# Patient Record
Sex: MALE | Race: WHITE | Employment: FULL TIME | URBAN - METROPOLITAN AREA
[De-identification: names, ages, dates, MRNs, and addresses within clinical notes are randomized per-mention and may not be internally consistent; named-entity substitution may affect disease eponyms.]

---

## 2019-06-11 RX ORDER — TRAMADOL HYDROCHLORIDE 50 MG/1
50 TABLET ORAL EVERY 6 HOURS PRN
COMMUNITY

## 2019-06-11 NOTE — PROGRESS NOTES
C-Difficile admission screening and protocol:     * Admitted with diarrhea?no     *Prior history of C-Diff. In last 3 months?no     *Antibiotic use in the past 6-8 weeks?no     *Prior hospitalization or nursing home in the last [facility] 51 time___1030 per pt_________        Surgery time_1230___________    Take the following medications with a sip of water: Follow your MD/Surgeons pre-procedure instructions regarding your medications    Do not eat or drink anything after 12:00 midnight prior to your surgery. This includes water chewing gum, mints and ice chips. You may brush your teeth and gargle the morning of your surgery, but do not swallow the water     Please see your family doctor/pediatrician for a history and physical and/or concerning medications. Bring any test results/reports from your physicians office. If you are under the care of a heart doctor or specialist doctor, please be aware that you may be asked to them for clearance    You may be asked to stop blood thinners such as Coumadin, Plavix, Fragmin, Lovenox, etc., or any anti-inflammatories such as:  Aspirin, Ibuprofen, Advil, Naproxen prior to your surgery. We also ask that you stop any OTC medications such as fish oil, vitamin E, glucosamine, garlic, Multivitamins, COQ 10, etc.    We ask that you do not smoke 24 hours prior to surgery  We ask that you do not  drink any alcoholic beverages 24 hours prior to surgery     You must make arrangements for a responsible adult to take you home after your surgery. For your safety you will not be allowed to leave alone or drive yourself home. Your surgery will be cancelled if you do not have a ride home. Also for your safety, it is strongly suggested that someone stay with you the first 24 hours after your surgery.      A parent or legal guardian must accompany a child scheduled for surgery and plan to stay at the hospital until [facility] phone number:  [phone] PAT fax number:  [phone]  Please note these are generalized instructions for all surgical cases, you may be provided with more specific instructions according to your surgery.

## 2019-06-12 ENCOUNTER — ANESTHESIA EVENT (OUTPATIENT)
Dept: OPERATING ROOM | Age: 44
End: 2019-06-12
Payer: COMMERCIAL

## 2019-06-13 ENCOUNTER — APPOINTMENT (OUTPATIENT)
Dept: GENERAL RADIOLOGY | Age: 44
End: 2019-06-13
Attending: PODIATRIST
Payer: COMMERCIAL

## 2019-06-13 ENCOUNTER — ANESTHESIA (OUTPATIENT)
Dept: OPERATING ROOM | Age: 44
End: 2019-06-13
Payer: COMMERCIAL

## 2019-06-13 ENCOUNTER — HOSPITAL ENCOUNTER (OUTPATIENT)
Age: 44
Setting detail: OUTPATIENT SURGERY
Discharge: HOME | End: 2019-06-13
Attending: PODIATRIST | Admitting: PODIATRIST
Payer: COMMERCIAL

## 2019-06-13 VITALS
RESPIRATION RATE: 16 BRPM | TEMPERATURE: 98.1 F | OXYGEN SATURATION: 97 % | DIASTOLIC BLOOD PRESSURE: 64 MMHG | BODY MASS INDEX: 31.7 KG/M2 | SYSTOLIC BLOOD PRESSURE: 115 MMHG | HEART RATE: 94 BPM | HEIGHT: 72 IN | WEIGHT: 234.02 LBS

## 2019-06-13 VITALS
OXYGEN SATURATION: 94 % | DIASTOLIC BLOOD PRESSURE: 54 MMHG | TEMPERATURE: 99.1 F | RESPIRATION RATE: 4 BRPM | SYSTOLIC BLOOD PRESSURE: 95 MMHG

## 2019-06-13 DIAGNOSIS — G89.18 POST-OP PAIN: Primary | ICD-10-CM

## 2019-06-13 PROCEDURE — 3600000004 HC SURGERY LEVEL 4 BASE: Performed by: PODIATRIST

## 2019-06-13 PROCEDURE — C1769 GUIDE WIRE: HCPCS | Performed by: PODIATRIST

## 2019-06-13 PROCEDURE — 3700000000 HC ANESTHESIA ATTENDED CARE: Performed by: PODIATRIST

## 2019-06-13 PROCEDURE — 7100000010 HC PHASE II RECOVERY - FIRST 15 MIN: Performed by: PODIATRIST

## 2019-06-13 PROCEDURE — 2580000003 HC RX 258: Performed by: PODIATRIST

## 2019-06-13 PROCEDURE — 7100000011 HC PHASE II RECOVERY - ADDTL 15 MIN: Performed by: PODIATRIST

## 2019-06-13 PROCEDURE — 6360000002 HC RX W HCPCS: Performed by: ANESTHESIOLOGY

## 2019-06-13 PROCEDURE — 2580000003 HC RX 258: Performed by: ANESTHESIOLOGY

## 2019-06-13 PROCEDURE — 6370000000 HC RX 637 (ALT 250 FOR IP): Performed by: ANESTHESIOLOGY

## 2019-06-13 PROCEDURE — C1713 ANCHOR/SCREW BN/BN,TIS/BN: HCPCS | Performed by: PODIATRIST

## 2019-06-13 PROCEDURE — 6360000002 HC RX W HCPCS: Performed by: NURSE ANESTHETIST, CERTIFIED REGISTERED

## 2019-06-13 PROCEDURE — 7100000001 HC PACU RECOVERY - ADDTL 15 MIN: Performed by: PODIATRIST

## 2019-06-13 PROCEDURE — 2500000003 HC RX 250 WO HCPCS

## 2019-06-13 PROCEDURE — 6360000002 HC RX W HCPCS

## 2019-06-13 PROCEDURE — 3600000014 HC SURGERY LEVEL 4 ADDTL 15MIN: Performed by: PODIATRIST

## 2019-06-13 PROCEDURE — 2709999900 HC NON-CHARGEABLE SUPPLY: Performed by: PODIATRIST

## 2019-06-13 PROCEDURE — 2720000010 HC SURG SUPPLY STERILE: Performed by: PODIATRIST

## 2019-06-13 PROCEDURE — 3700000001 HC ADD 15 MINUTES (ANESTHESIA): Performed by: PODIATRIST

## 2019-06-13 PROCEDURE — 7100000000 HC PACU RECOVERY - FIRST 15 MIN: Performed by: PODIATRIST

## 2019-06-13 PROCEDURE — 73630 X-RAY EXAM OF FOOT: CPT

## 2019-06-13 PROCEDURE — 2500000003 HC RX 250 WO HCPCS: Performed by: PODIATRIST

## 2019-06-13 DEVICE — IMPLANTABLE DEVICE: Type: IMPLANTABLE DEVICE | Site: FOOT | Status: FUNCTIONAL

## 2019-06-13 DEVICE — SCREW BNE L18MM DIA3.5MM TI LO PROF FOR ANK FRAC SET: Type: IMPLANTABLE DEVICE | Site: FOOT | Status: FUNCTIONAL

## 2019-06-13 DEVICE — PLATE BNE SHT R PLNTR FOR COMPHSVE FT SYS LAPIDUS: Type: IMPLANTABLE DEVICE | Site: FOOT | Status: FUNCTIONAL

## 2019-06-13 DEVICE — IMPLANTABLE DEVICE
Type: IMPLANTABLE DEVICE | Site: FOOT | Status: FUNCTIONAL
Brand: MICROAIRE®

## 2019-06-13 DEVICE — SCREW BNE L13MM DIA2MM CORT FT ANK TI SELF DRL ST SLD FULL: Type: IMPLANTABLE DEVICE | Site: FOOT | Status: FUNCTIONAL

## 2019-06-13 RX ORDER — OXYCODONE AND ACETAMINOPHEN 10; 325 MG/1; MG/1
1 TABLET ORAL EVERY 6 HOURS PRN
Qty: 28 TABLET | Refills: 0 | Status: SHIPPED | OUTPATIENT
Start: 2019-06-13 | End: 2019-06-20

## 2019-06-13 RX ORDER — PROPOFOL 10 MG/ML
INJECTION, EMULSION INTRAVENOUS PRN
Status: DISCONTINUED | OUTPATIENT
Start: 2019-06-13 | End: 2019-06-13 | Stop reason: SDUPTHER

## 2019-06-13 RX ORDER — BUPIVACAINE HYDROCHLORIDE 5 MG/ML
INJECTION, SOLUTION EPIDURAL; INTRACAUDAL
Status: COMPLETED | OUTPATIENT
Start: 2019-06-13 | End: 2019-06-13

## 2019-06-13 RX ORDER — MORPHINE SULFATE 2 MG/ML
1 INJECTION, SOLUTION INTRAMUSCULAR; INTRAVENOUS EVERY 5 MIN PRN
Status: DISCONTINUED | OUTPATIENT
Start: 2019-06-13 | End: 2019-06-13 | Stop reason: HOSPADM

## 2019-06-13 RX ORDER — MEPERIDINE HYDROCHLORIDE 25 MG/ML
12.5 INJECTION INTRAMUSCULAR; INTRAVENOUS; SUBCUTANEOUS EVERY 5 MIN PRN
Status: DISCONTINUED | OUTPATIENT
Start: 2019-06-13 | End: 2019-06-13 | Stop reason: HOSPADM

## 2019-06-13 RX ORDER — OXYCODONE HYDROCHLORIDE AND ACETAMINOPHEN 5; 325 MG/1; MG/1
1 TABLET ORAL PRN
Status: COMPLETED | OUTPATIENT
Start: 2019-06-13 | End: 2019-06-13

## 2019-06-13 RX ORDER — GABAPENTIN 100 MG/1
100 CAPSULE ORAL NIGHTLY
Qty: 30 CAPSULE | Refills: 0 | Status: SHIPPED | OUTPATIENT
Start: 2019-06-13 | End: 2019-07-13

## 2019-06-13 RX ORDER — LIDOCAINE HYDROCHLORIDE 20 MG/ML
INJECTION, SOLUTION INFILTRATION; PERINEURAL
Status: COMPLETED | OUTPATIENT
Start: 2019-06-13 | End: 2019-06-13

## 2019-06-13 RX ORDER — ONDANSETRON 2 MG/ML
INJECTION INTRAMUSCULAR; INTRAVENOUS PRN
Status: DISCONTINUED | OUTPATIENT
Start: 2019-06-13 | End: 2019-06-13 | Stop reason: SDUPTHER

## 2019-06-13 RX ORDER — SODIUM CHLORIDE 0.9 % (FLUSH) 0.9 %
10 SYRINGE (ML) INJECTION PRN
Status: DISCONTINUED | OUTPATIENT
Start: 2019-06-13 | End: 2019-06-13 | Stop reason: HOSPADM

## 2019-06-13 RX ORDER — MORPHINE SULFATE 2 MG/ML
2 INJECTION, SOLUTION INTRAMUSCULAR; INTRAVENOUS EVERY 5 MIN PRN
Status: DISCONTINUED | OUTPATIENT
Start: 2019-06-13 | End: 2019-06-13 | Stop reason: HOSPADM

## 2019-06-13 RX ORDER — DEXAMETHASONE SODIUM PHOSPHATE 4 MG/ML
INJECTION, SOLUTION INTRA-ARTICULAR; INTRALESIONAL; INTRAMUSCULAR; INTRAVENOUS; SOFT TISSUE PRN
Status: DISCONTINUED | OUTPATIENT
Start: 2019-06-13 | End: 2019-06-13 | Stop reason: SDUPTHER

## 2019-06-13 RX ORDER — FENTANYL CITRATE 50 UG/ML
INJECTION, SOLUTION INTRAMUSCULAR; INTRAVENOUS PRN
Status: DISCONTINUED | OUTPATIENT
Start: 2019-06-13 | End: 2019-06-13 | Stop reason: SDUPTHER

## 2019-06-13 RX ORDER — SODIUM CHLORIDE 9 MG/ML
INJECTION, SOLUTION INTRAVENOUS CONTINUOUS
Status: DISCONTINUED | OUTPATIENT
Start: 2019-06-13 | End: 2019-06-13 | Stop reason: HOSPADM

## 2019-06-13 RX ORDER — SUCCINYLCHOLINE/SOD CL,ISO/PF 200MG/10ML
SYRINGE (ML) INTRAVENOUS PRN
Status: DISCONTINUED | OUTPATIENT
Start: 2019-06-13 | End: 2019-06-13 | Stop reason: SDUPTHER

## 2019-06-13 RX ORDER — LABETALOL HYDROCHLORIDE 5 MG/ML
5 INJECTION, SOLUTION INTRAVENOUS EVERY 10 MIN PRN
Status: DISCONTINUED | OUTPATIENT
Start: 2019-06-13 | End: 2019-06-13 | Stop reason: HOSPADM

## 2019-06-13 RX ORDER — HYDRALAZINE HYDROCHLORIDE 20 MG/ML
5 INJECTION INTRAMUSCULAR; INTRAVENOUS
Status: DISCONTINUED | OUTPATIENT
Start: 2019-06-13 | End: 2019-06-13 | Stop reason: HOSPADM

## 2019-06-13 RX ORDER — PROMETHAZINE HYDROCHLORIDE 25 MG/1
25 TABLET ORAL EVERY 6 HOURS PRN
Qty: 20 TABLET | Refills: 0 | Status: SHIPPED | OUTPATIENT
Start: 2019-06-13 | End: 2019-06-20

## 2019-06-13 RX ORDER — LIDOCAINE HYDROCHLORIDE 20 MG/ML
INJECTION, SOLUTION EPIDURAL; INFILTRATION; INTRACAUDAL; PERINEURAL PRN
Status: DISCONTINUED | OUTPATIENT
Start: 2019-06-13 | End: 2019-06-13 | Stop reason: SDUPTHER

## 2019-06-13 RX ORDER — SODIUM CHLORIDE 0.9 % (FLUSH) 0.9 %
10 SYRINGE (ML) INJECTION EVERY 12 HOURS SCHEDULED
Status: DISCONTINUED | OUTPATIENT
Start: 2019-06-13 | End: 2019-06-13 | Stop reason: HOSPADM

## 2019-06-13 RX ORDER — ROCURONIUM BROMIDE 10 MG/ML
INJECTION, SOLUTION INTRAVENOUS PRN
Status: DISCONTINUED | OUTPATIENT
Start: 2019-06-13 | End: 2019-06-13 | Stop reason: SDUPTHER

## 2019-06-13 RX ORDER — MAGNESIUM HYDROXIDE 1200 MG/15ML
LIQUID ORAL CONTINUOUS PRN
Status: COMPLETED | OUTPATIENT
Start: 2019-06-13 | End: 2019-06-13

## 2019-06-13 RX ORDER — MIDAZOLAM HYDROCHLORIDE 1 MG/ML
INJECTION INTRAMUSCULAR; INTRAVENOUS PRN
Status: DISCONTINUED | OUTPATIENT
Start: 2019-06-13 | End: 2019-06-13 | Stop reason: SDUPTHER

## 2019-06-13 RX ORDER — KETOROLAC TROMETHAMINE 30 MG/ML
INJECTION, SOLUTION INTRAMUSCULAR; INTRAVENOUS PRN
Status: DISCONTINUED | OUTPATIENT
Start: 2019-06-13 | End: 2019-06-13 | Stop reason: SDUPTHER

## 2019-06-13 RX ORDER — PROMETHAZINE HYDROCHLORIDE 25 MG/1
12.5 TABLET ORAL
Status: COMPLETED | OUTPATIENT
Start: 2019-06-13 | End: 2019-06-13

## 2019-06-13 RX ORDER — ONDANSETRON 2 MG/ML
4 INJECTION INTRAMUSCULAR; INTRAVENOUS
Status: COMPLETED | OUTPATIENT
Start: 2019-06-13 | End: 2019-06-13

## 2019-06-13 RX ORDER — OXYCODONE HYDROCHLORIDE AND ACETAMINOPHEN 5; 325 MG/1; MG/1
2 TABLET ORAL PRN
Status: COMPLETED | OUTPATIENT
Start: 2019-06-13 | End: 2019-06-13

## 2019-06-13 RX ORDER — IBUPROFEN 800 MG/1
800 TABLET ORAL EVERY 8 HOURS PRN
Qty: 30 TABLET | Refills: 0 | Status: SHIPPED | OUTPATIENT
Start: 2019-06-13

## 2019-06-13 RX ADMIN — FENTANYL CITRATE 50 MCG: 50 INJECTION INTRAMUSCULAR; INTRAVENOUS at 13:31

## 2019-06-13 RX ADMIN — HYDROMORPHONE HYDROCHLORIDE 0.5 MG: 1 INJECTION, SOLUTION INTRAMUSCULAR; INTRAVENOUS; SUBCUTANEOUS at 14:15

## 2019-06-13 RX ADMIN — Medication 160 MG: at 12:38

## 2019-06-13 RX ADMIN — HYDROMORPHONE HYDROCHLORIDE 0.5 MG: 1 INJECTION, SOLUTION INTRAMUSCULAR; INTRAVENOUS; SUBCUTANEOUS at 14:44

## 2019-06-13 RX ADMIN — ONDANSETRON 4 MG: 2 INJECTION INTRAMUSCULAR; INTRAVENOUS at 16:46

## 2019-06-13 RX ADMIN — OXYCODONE HYDROCHLORIDE AND ACETAMINOPHEN 2 TABLET: 5; 325 TABLET ORAL at 17:40

## 2019-06-13 RX ADMIN — FENTANYL CITRATE 50 MCG: 50 INJECTION INTRAMUSCULAR; INTRAVENOUS at 14:30

## 2019-06-13 RX ADMIN — DEXAMETHASONE SODIUM PHOSPHATE 8 MG: 4 INJECTION, SOLUTION INTRAMUSCULAR; INTRAVENOUS at 13:01

## 2019-06-13 RX ADMIN — ROCURONIUM BROMIDE 10 MG: 10 INJECTION INTRAVENOUS at 12:38

## 2019-06-13 RX ADMIN — PROPOFOL 200 MG: 10 INJECTION, EMULSION INTRAVENOUS at 12:38

## 2019-06-13 RX ADMIN — SODIUM CHLORIDE: 9 INJECTION, SOLUTION INTRAVENOUS at 14:46

## 2019-06-13 RX ADMIN — FENTANYL CITRATE 100 MCG: 50 INJECTION INTRAMUSCULAR; INTRAVENOUS at 12:38

## 2019-06-13 RX ADMIN — LIDOCAINE HYDROCHLORIDE 100 MG: 20 INJECTION, SOLUTION EPIDURAL; INFILTRATION; INTRACAUDAL; PERINEURAL at 12:38

## 2019-06-13 RX ADMIN — ONDANSETRON 4 MG: 2 INJECTION INTRAMUSCULAR; INTRAVENOUS at 16:09

## 2019-06-13 RX ADMIN — SODIUM CHLORIDE: 9 INJECTION, SOLUTION INTRAVENOUS at 11:04

## 2019-06-13 RX ADMIN — PROMETHAZINE HYDROCHLORIDE 12.5 MG: 25 TABLET ORAL at 18:09

## 2019-06-13 RX ADMIN — KETOROLAC TROMETHAMINE 30 MG: 30 INJECTION, SOLUTION INTRAMUSCULAR at 16:09

## 2019-06-13 RX ADMIN — MIDAZOLAM 2 MG: 1 INJECTION INTRAMUSCULAR; INTRAVENOUS at 12:25

## 2019-06-13 ASSESSMENT — PAIN - FUNCTIONAL ASSESSMENT
PAIN_FUNCTIONAL_ASSESSMENT: ACTIVITIES ARE NOT PREVENTED
PAIN_FUNCTIONAL_ASSESSMENT: PREVENTS OR INTERFERES SOME ACTIVE ACTIVITIES AND ADLS
PAIN_FUNCTIONAL_ASSESSMENT: 0-10

## 2019-06-13 ASSESSMENT — PULMONARY FUNCTION TESTS
PIF_VALUE: 24
PIF_VALUE: 21
PIF_VALUE: 21
PIF_VALUE: 22
PIF_VALUE: 1
PIF_VALUE: 22
PIF_VALUE: 20
PIF_VALUE: 21
PIF_VALUE: 19
PIF_VALUE: 22
PIF_VALUE: 19
PIF_VALUE: 21
PIF_VALUE: 20
PIF_VALUE: 22
PIF_VALUE: 19
PIF_VALUE: 22
PIF_VALUE: 21
PIF_VALUE: 21
PIF_VALUE: 20
PIF_VALUE: 21
PIF_VALUE: 20
PIF_VALUE: 20
PIF_VALUE: 21
PIF_VALUE: 23
PIF_VALUE: 22
PIF_VALUE: 20
PIF_VALUE: 21
PIF_VALUE: 2
PIF_VALUE: 22
PIF_VALUE: 22
PIF_VALUE: 20
PIF_VALUE: 5
PIF_VALUE: 22
PIF_VALUE: 21
PIF_VALUE: 23
PIF_VALUE: 22
PIF_VALUE: 21
PIF_VALUE: 23
PIF_VALUE: 20
PIF_VALUE: 1
PIF_VALUE: 20
PIF_VALUE: 19
PIF_VALUE: 13
PIF_VALUE: 21
PIF_VALUE: 21
PIF_VALUE: 22
PIF_VALUE: 24
PIF_VALUE: 13
PIF_VALUE: 21
PIF_VALUE: 22
PIF_VALUE: 20
PIF_VALUE: 19
PIF_VALUE: 21
PIF_VALUE: 20
PIF_VALUE: 20
PIF_VALUE: 22
PIF_VALUE: 20
PIF_VALUE: 21
PIF_VALUE: 21
PIF_VALUE: 22
PIF_VALUE: 22
PIF_VALUE: 20
PIF_VALUE: 21
PIF_VALUE: 22
PIF_VALUE: 21
PIF_VALUE: 21
PIF_VALUE: 19
PIF_VALUE: 19
PIF_VALUE: 22
PIF_VALUE: 22
PIF_VALUE: 20
PIF_VALUE: 22
PIF_VALUE: 22
PIF_VALUE: 19
PIF_VALUE: 19
PIF_VALUE: 21
PIF_VALUE: 20
PIF_VALUE: 20
PIF_VALUE: 21
PIF_VALUE: 20
PIF_VALUE: 13
PIF_VALUE: 22
PIF_VALUE: 22
PIF_VALUE: 20
PIF_VALUE: 19
PIF_VALUE: 13
PIF_VALUE: 0
PIF_VALUE: 20
PIF_VALUE: 1
PIF_VALUE: 21
PIF_VALUE: 18
PIF_VALUE: 20
PIF_VALUE: 20
PIF_VALUE: 21
PIF_VALUE: 0
PIF_VALUE: 21
PIF_VALUE: 22
PIF_VALUE: 21
PIF_VALUE: 20
PIF_VALUE: 23
PIF_VALUE: 20
PIF_VALUE: 21
PIF_VALUE: 1
PIF_VALUE: 23
PIF_VALUE: 22
PIF_VALUE: 21
PIF_VALUE: 20
PIF_VALUE: 22
PIF_VALUE: 23
PIF_VALUE: 20
PIF_VALUE: 21
PIF_VALUE: 13
PIF_VALUE: 21
PIF_VALUE: 20
PIF_VALUE: 13
PIF_VALUE: 20
PIF_VALUE: 22
PIF_VALUE: 21
PIF_VALUE: 22
PIF_VALUE: 19
PIF_VALUE: 21
PIF_VALUE: 22
PIF_VALUE: 20
PIF_VALUE: 20
PIF_VALUE: 1
PIF_VALUE: 23
PIF_VALUE: 21
PIF_VALUE: 1
PIF_VALUE: 22
PIF_VALUE: 22
PIF_VALUE: 20
PIF_VALUE: 0
PIF_VALUE: 22
PIF_VALUE: 24
PIF_VALUE: 22
PIF_VALUE: 20
PIF_VALUE: 20
PIF_VALUE: 22
PIF_VALUE: 21
PIF_VALUE: 20
PIF_VALUE: 22
PIF_VALUE: 22
PIF_VALUE: 24
PIF_VALUE: 24
PIF_VALUE: 21
PIF_VALUE: 20
PIF_VALUE: 23
PIF_VALUE: 4
PIF_VALUE: 0
PIF_VALUE: 20
PIF_VALUE: 21
PIF_VALUE: 22
PIF_VALUE: 20
PIF_VALUE: 22
PIF_VALUE: 21
PIF_VALUE: 20
PIF_VALUE: 22
PIF_VALUE: 19
PIF_VALUE: 21
PIF_VALUE: 21
PIF_VALUE: 20
PIF_VALUE: 4
PIF_VALUE: 24
PIF_VALUE: 21
PIF_VALUE: 20
PIF_VALUE: 21
PIF_VALUE: 20
PIF_VALUE: 20
PIF_VALUE: 0
PIF_VALUE: 21
PIF_VALUE: 19
PIF_VALUE: 20
PIF_VALUE: 21
PIF_VALUE: 20
PIF_VALUE: 20
PIF_VALUE: 13
PIF_VALUE: 22
PIF_VALUE: 26
PIF_VALUE: 20
PIF_VALUE: 21
PIF_VALUE: 20
PIF_VALUE: 24
PIF_VALUE: 22
PIF_VALUE: 4
PIF_VALUE: 21
PIF_VALUE: 22
PIF_VALUE: 21
PIF_VALUE: 24
PIF_VALUE: 19
PIF_VALUE: 20
PIF_VALUE: 19
PIF_VALUE: 19
PIF_VALUE: 17
PIF_VALUE: 21
PIF_VALUE: 23
PIF_VALUE: 5
PIF_VALUE: 21
PIF_VALUE: 0
PIF_VALUE: 16
PIF_VALUE: 19
PIF_VALUE: 21
PIF_VALUE: 22
PIF_VALUE: 1
PIF_VALUE: 22
PIF_VALUE: 22
PIF_VALUE: 21
PIF_VALUE: 22
PIF_VALUE: 4
PIF_VALUE: 20
PIF_VALUE: 22
PIF_VALUE: 20
PIF_VALUE: 20
PIF_VALUE: 19

## 2019-06-13 ASSESSMENT — PAIN DESCRIPTION - FREQUENCY
FREQUENCY: CONTINUOUS

## 2019-06-13 ASSESSMENT — PAIN DESCRIPTION - DESCRIPTORS
DESCRIPTORS: ACHING

## 2019-06-13 ASSESSMENT — PAIN DESCRIPTION - PROGRESSION
CLINICAL_PROGRESSION: NOT CHANGED
CLINICAL_PROGRESSION: RAPIDLY IMPROVING

## 2019-06-13 ASSESSMENT — PAIN DESCRIPTION - ONSET
ONSET: ON-GOING

## 2019-06-13 ASSESSMENT — PAIN DESCRIPTION - LOCATION
LOCATION: SHOULDER

## 2019-06-13 ASSESSMENT — PAIN DESCRIPTION - ORIENTATION
ORIENTATION: RIGHT

## 2019-06-13 ASSESSMENT — PAIN SCALES - GENERAL
PAINLEVEL_OUTOF10: 0
PAINLEVEL_OUTOF10: 2
PAINLEVEL_OUTOF10: 10
PAINLEVEL_OUTOF10: 9

## 2019-06-13 ASSESSMENT — PAIN DESCRIPTION - PAIN TYPE
TYPE: CHRONIC PAIN
TYPE: CHRONIC PAIN
TYPE: ACUTE PAIN

## 2019-06-13 NOTE — PROGRESS NOTES
Pt arrived to PACU from OR. Pt sleeping on 3l/nc with oral airway in place. Right foot with ace wrap C/D/I. Toes warm. Cap refill WNL.   VSS

## 2019-06-13 NOTE — PROGRESS NOTES
Pt alert and oriented, pain in shoulder- had previous rotator cuff injury, no pain in foot. Pt's mother at bedside for discharge instructions. No further questions at this time.

## 2019-06-13 NOTE — ANESTHESIA PRE PROCEDURE
[facility] Department of Anesthesiology  Pre-Anesthesia Evaluation/Consultation       Name:  Kinsey Perez  : 1975  Age:  45 y.o. MRN:  [id number]  Date: 2019           Surgeon: Surgeon(s):  Verónica Melgar DPM    Procedure: Procedure(s):  RIGHT FOOT LAPIDUS ARTHRODESIS, PLANTAR INTERPHALANGEAL JOINT ARTHRODESIS SECOND DIGIT, PLANTAR PLATE REPAIR SECOND METATARSAL PHALANGEAL JOINT, SHORTENING OSTEOTOMY SECOND METATARSAL WITH MINI C-ARM     Allergies   Allergen Reactions    Codeine Nausea Only     There is no problem list on file for this patient. History reviewed. No pertinent past medical history. Past Surgical History:   Procedure Laterality Date    FOOT SURGERY Left     KNEE SURGERY Left     x`2 meniscus    KNEE SURGERY Right     meniscus    SHOULDER SURGERY Right     WISDOM TOOTH EXTRACTION       Social History     Tobacco Use    Smoking status: Former Smoker     Last attempt to quit: 2004     Years since quitting: 15.0    Smokeless tobacco: Never Used   Substance Use Topics    Alcohol use: Yes     Comment: socially    Drug use: Never     Medications  No current facility-administered medications on file prior to encounter. Current Outpatient Medications on File Prior to Encounter   Medication Sig Dispense Refill    traMADol (ULTRAM) 50 MG tablet Take 50 mg by mouth every 6 hours as needed for Pain.       naproxen-diphenhydramine 220-25 MG TABS Take by mouth       Current Facility-Administered Medications   Medication Dose Route Frequency Provider Last Rate Last Dose    ceFAZolin (ANCEF) 2 g in dextrose 5 % 100 mL IVPB  2 g Intravenous Once Verónica Melgar DPM        0.9 % sodium chloride infusion   Intravenous Continuous Suraj Ramos MD 75 mL/hr at 19 1104      sodium chloride flush 0.9 % injection 10 mL  10 mL Intravenous 2 times per day Suraj Ramos MD        sodium chloride flush 0.9 % injection 10 mL  10 mL Intravenous PRN Elijah Joshua MD         Vital Signs (Current)   Vitals:    19 1143 19 1049   BP:  112/65   Pulse:  80   Resp:  16   Temp:  98.2 °F (36.8 °C)   TempSrc:  Oral   SpO2:  96%   Weight: 235 lb (106.6 kg) 234 lb 0.3 oz (106.1 kg)   Height: 6' (1.829 m) 6' (1.829 m)                                          BP Readings from Last 3 Encounters:   19 112/65     Vital Signs Statistics (for past 48 hrs)     Temp  Av.2 °F (36.8 °C)  Min: 98.2 °F (36.8 °C)   Min taken time: 19 104  Max: 98.2 °F (36.8 °C)   Max taken time: 19 104  Pulse  Av  Min: [de-identified]   Min taken time: 19 104  Max: [de-identified]   Max taken time: 19 104  Resp  Av  Min: 12   Min taken time: 19 104  Max: 16   Max taken time: 19 1049  BP  Min: 112/65   Min taken time: 19 1049  Max: 112/65   Max taken time: 19 1049  SpO2  Av %  Min: 96 %   Min taken time: 19 104  Max: 96 %   Max taken time: 19 1049  BP Readings from Last 3 Encounters:   19 112/65       BMI  Body mass index is 31.74 kg/m². Estimated body mass index is 31.74 kg/m² as calculated from the following:    Height as of this encounter: 6' (1.829 m). Weight as of this encounter: 234 lb 0.3 oz (106.1 kg).     CBC   Lab Results   Component Value Date    WBC 7.2 2012    RBC 4.74 2012    HGB 14.1 2012    HCT 39.6 2012    MCV 83.7 2012    RDW 13.7 2012     2012     CMP    Lab Results   Component Value Date     2012    K 4.2 2012     2012    CO2 25 2012    BUN 26 2012    CREATININE 1.2 2012    GFRAA >60 2012    AGRATIO 1.8 2012    GLUCOSE 93 2012    PROT 6.6 2012    CALCIUM 9.2 2012    BILITOT 0.40 2012    ALKPHOS 88 2012    AST 47 2012    ALT 34 2012     BMP    Lab Results   Component Value Date     2012    K 4.2 2012     05/31/2012    CO2 25 05/31/2012    BUN 26 05/31/2012    CREATININE 1.2 05/31/2012    CALCIUM 9.2 05/31/2012    GFRAA >60 05/31/2012    GLUCOSE 93 05/31/2012     POCGlucose  No results for input(s): GLUCOSE in the last 72 hours. Coags  No results found for: PROTIME, INR, APTT  HCG (If Applicable) No results found for: PREGTESTUR, PREGSERUM, HCG, HCGQUANT   ABGs No results found for: PHART, PO2ART, GDU1EOY, GOY8AYU, BEART, J1XMNLFU   Type & Screen (If Applicable)  No results found for: LABABO, LABRH                         BMI: Wt Readings from Last 3 Encounters:       NPO Status:   Date of last liquid consumption: 06/12/19   Time of last liquid consumption: 2100   Date of last solid food consumption: 06/12/19      Time of last solid consumption: 1900       Anesthesia Evaluation    Airway: Mallampati: III  TM distance: >3 FB   Neck ROM: full  Mouth opening: > = 3 FB Dental:          Pulmonary:                              Cardiovascular:                      Neuro/Psych:               GI/Hepatic/Renal:             Endo/Other:                     Abdominal:           Vascular:                                        Anesthesia Plan      general     ASA 2     (I discussed with the patient the risks and benefits of PIV, general anesthesia, IV Narcotics, PACU. All questions were answered the patient agrees with the plan.)  Induction: intravenous. Anesthetic plan and risks discussed with patient. Plan discussed with CRNA. This pre-anesthesia assessment may be used as a history and physical.    DOS STAFF ADDENDUM:    Pt seen and examined, chart reviewed (including anesthesia, drug and allergy history). No interval changes to history and physical examination. Anesthetic plan, risks, benefits, alternatives, and personnel involved discussed with patient. Patient verbalized an understanding and agrees to proceed.       Angela Chiang MD  June 13, 2019  11:42 AM

## 2019-06-13 NOTE — PROGRESS NOTES
Pt's nausea unrelieved- pt has script for PO Phenergan for at home. Half a dose (12.5mg) given per anesthesia before discharge.

## 2019-06-13 NOTE — PROGRESS NOTES
Pt states pain is improved after pain pills. Remains nauseated. Will continue phenergan PO at home as needed.

## 2019-06-13 NOTE — BRIEF OP NOTE
Brief Postoperative Note  ______________________________________________________________    Patient: Amy Calhoun  YOB: 1975  MRN: [id number]  Date of Procedure: 6/13/2019    Pre-Op Diagnosis: RIGHT FOOT HALLUX ABDUCTO VALGUS, DISLOCATED SECOND METATARSAL PHALANGEAL JOINT, HAMMERTOE SECOND DIGIT, ELONGATED SECOND METATARSAL    Post-Op Diagnosis: Same       Procedure(s):  1. RIGHT FOOT LAPIDUS ARTHRODESIS,   2. INTERPHALANGEAL JOINT ARTHRODESIS SECOND DIGIT with dorsal capsulotomy,  3. SHORTENING OSTEOTOMY SECOND METATARSAL     Anesthesia: General with local     Surgeon(s):  Mardel Bosworth, DPM    Assistant:   Luiz Godwin DPM PGY-2    Estimated Blood Loss (mL): 50    Hemostasis: thigh tourniquet set at 350mmhg for 111 min. Injections: 20cc lidocaine plain 2%, 30cc marcaine plain 0.5%. Complications: None    Specimens:   * No specimens in log *    Implants:  Implant Name Type Inv. Item Serial No.  Lot No. LRB No. Used   PLATE PLANTAR LAPIDUS RT SHRT Screw/Plate/Nail/Nimesh PLATE PLANTAR LAPIDUS RT SHRT  [employer]  Right 1   SCREW NONLK 3.5X18MM Screw/Plate/Nail/Nimesh SCREW NONLK 3.5X18MM  [employer]  Right 1   SCREW LK TI 3.5X22MM Screw/Plate/Nail/Nimesh SCREW LK TI 3.5X22MM  [employer]  Right 1   SCREW LK TI 3.5X26MM Screw/Plate/Nail/Nimesh SCREW LK TI 3.5X26MM  [employer]  Right 1   SCREW LK TI  3.5X30MM Screw/Plate/Nail/Nimesh SCREW LK TI  3.5X30MM  [employer]  Right 1   SCREW QUICK FIX TI 2X13MM Screw/Plate/Nail/Nimesh SCREW QUICK FIX TI 2X13MM  [employer]  Right 1         Drains: * No LDAs found *    Findings: as expected.      Bill Rod DPM  Date: 6/13/2019  Time: 4:25 PM

## 2019-06-20 NOTE — H&P
H&P Update    Patient's History and Physical from June , 2019 was reviewed. Patient examined. There has been no change.     Olga Dejesus DPM

## 2019-06-21 NOTE — OP NOTE
[address] [place] [address] [facility]                                OPERATIVE REPORT    PATIENT NAME: Meera Naranjo                         :        1975  MED REC NO:   [id number]                          ROOM:  ACCOUNT NO:   [de-identified]                           ADMIT DATE: 2019  PROVIDER:     Javon Valladares DPM      DATE OF PROCEDURE:  2019    PREOPERATIVE DIAGNOSES:  1. Hallux abductovalgus deformity, right foot. 2.  Hammertoe deformity, right second digit. 3.  Elongated second metatarsal, right foot. 4.  Dislocated second MPJ, right foot. POSTOPERATIVE DIAGNOSES:  1. Hallux abductovalgus deformity, right foot. 2.  Hammertoe deformity, right second digit. 3.  Elongated second metatarsal, right foot. 4.  Dislocated second MPJ, right foot. OPERATION PERFORMED:  1. Lapidus arthrodesis, right foot. 2. PIPJ arthrodesis with sequential reduction and dorsal capsulotomy of  second digit, right foot. 3.  Shortening osteotomy, second metatarsophalangeal joint. SURGEON:  Javon Valladares DPM    ASSISTANT:  Kelton Calderón DPM, PGY-2    ANESTHESIA:  General anesthetic with a local block consisting of 20 mL  of 2% lidocaine plain, postoperative injection includes 30 mL of 0.5%  Marcaine plain. ESTIMATED BLOOD LOSS:  Less than 50 mL. HEMOSTASIS:  Pneumatic thigh tourniquet at 350 mmHg, left up for 111  minutes. SPECIMENS:  None. IMPLANTS:  An Arthrex right plantar Lapidus plate short secured with a  total of four 3.5-mm locking and nonlocking screws and a 4.0 x 34 mm  partially threaded cannulated screw and a 2 mm x 30 mm pop-off screw x1. The hammertoe was fixated with 0.045 K-wire. INDICATIONS:  The patient presented to my office with complaints of a  painful bunion and hammertoe deformity.   Approximately 10 years ago, he  has undergone a bunionectomy and hammertoe repair on his left foot and  was very happy with his result. He wished to proceed with surgical  intervention. All risks versus benefits of the planned procedure were  discussed with the patient in detail. All questions were answered, no  guarantees were given. OPERATIVE PROCEDURE:  The patient was brought from the preoperative area  and placed on the operating table in a supine position. Following  induction of general anesthesia, a pneumatic thigh tourniquet was placed  on the patient's well-padded right thigh. An injection of 20 mL of 2%  lidocaine plain administered through the surgical incision site in an  ankle ring block fashion. The patient's right lower extremity was then  scrubbed, prepped, and draped in the usual sterile manner. A time-out  was performed. The patient, procedure, and operative site were  confirmed, and the following procedure was then performed. Lapidus arthrodesis, right foot: At this time, the limb was  exsanguinated and tourniquet was inflated and attention was directed  towards the bunion deformity. Incision was made at the dorsoplantar  _____ medially extended distally over the first metatarsophalangeal  joint. This incision was carried down with a 15 blade. Dissection was  carried down to deep fascia. The deep fascia was incised. At this  time, blunt dissection was carried into the first metatarsal space where  a soft tissue release was performed. The deep transverse metatarsal  ligament was transected and lateral collateral and tibial sesamoid  ligaments along with the adductor hallucis tendon were transected on the  lateral aspect of the joint. This markedly reduced the lateral  contraction that was present on the joint. Dissection was carried back down proximally at the level of the first  met-cuneiform joint. Dissection was carried down to the deep fascia. The deep fascia was incised to expose the adductor hallucis muscle  belly.   This was then reflected inferiorly and blunt dissection was  carried down to the level of the first met-cuneiform joint. At this  time, after the joint was confirmed utilizing C-arm fluoroscopy, an  arthrotomy was performed at the first met-cuneiform joint and the joint  was opened. Once adequate soft tissue exposure had been achieved, the  joint was distracted with a C-clamp-type distractor. Care was taken to  preserve the insertion point of the tibialis anterior tendon on the base  of the cuneiform with only resection of the small part that was on the  first metatarsal.  Once adequate soft tissue exposure had been achieved,  the base of the first metatarsal was resected with a power saw. The  medial cuneiform was then resected with a wedge bone, with the apex  oriented medially in order to close down the IM angle. This wedge of  bone was then removed. The joint surface was prepared after all  articular cartilage was removed. It was drilled subchondrally and fish  scaled. It was then temporarily fixated with a 0.062 K-wires. C-arm  fluoroscopy showed excellent position of the first met-cuneiform joint  and being happy with this correction, attention was now directed towards  fixation. At this time, utilizing C-arm fluoroscopy to ensure that the plate was  adequately placed, the plantar Lapidus plate was secured distally  utilizing a 3.5-mm nonlocking screw to secure it to the base of the  first metatarsal.  A second locking screw was placed at the medial screw  hole distally. C-arm fluoroscopy showed excellent placement of the  screws. At this time, utilizing a 4.0 x 34 mm partially threaded  cannulated screw, it was inserted through the plate into the medial  cuneiform to close down the arthrodesis site. Prior to securing that  screw completely, the temporary fixation was removed. Being happy with  this correction, the proximal two screws were filled with 3.5-mm locking  screws.   Being happy with this correction, the wound was then irrigated  with copious amounts of normal sterile saline. The attention was then directed back towards the distal aspect of the  joint where the remaining medial eminence was resected with a power saw. The area was then rasped free of all bony irregularities. A medial  capsulorrhaphy was performed to close down the redundant capsule left  from transposition of the arthrodesis, and the wound was then closed in  layers distally. Proximally, the small transected part of the tibialis  anterior tendon where it inserts into the base of the first metatarsal  was repaired. The arthrotomy site was repaired and the wound was then  closed in layers using 3-0 and 4-0 Vicryl suture. The skin incision was  reapproximated using 4-0 Monocryl suture. Being happy with this  correction, attention was now directed towards the next procedure. PIPJ arthrodesis with sequential reduction and dorsal capsulotomy, right  second digit:  At this time, attention was directed towards to the  patient's right second digit, right hammertoe deformity was noted to be  present with dislocation at the second metatarsophalangeal joint. At  this time, an incision was made beginning distally and extending  proximally to the metatarsophalangeal joint. This incision was carried  down with a 15 blade. Blunt dissection was carried down to the level of  the PIPJ. A transverse osteotomy was created and the head of the  proximal phalanx and base of the middle phalanx were cleared of all soft  tissue attachments. At this time, the extensor ames apparatus was  released. The forefoot was loaded via the Kelikian pushup test.  The  second MPJ still subluxed dorsally and so the decision was made to  proceed with a dorsal capsulotomy. Dorsal capsulotomy, second metatarsophalangeal joint, right foot: At  this time, utilizing a #15 blade, the dorsal capsular structures were  released both dorsally, medially, and laterally.   A McGlamry elevator  was inserted into the second metatarsophalangeal joint and the plantar  plate structures were released. Upon completion of this, the forefoot  was loaded via the Kelikian pushup test.  There was still subluxation  noted at the second metatarsophalangeal joint, so decision was made to  proceed with the next procedure. Shortening osteotomy, second metatarsal, right foot: At this time,  attention was directed towards the patient's second metatarsal.  Once  adequate soft tissue exposure had been achieved, a shortening osteotomy  was created with the saw run from dorsal distal to proximal plantar  parallel to weightbearing surface. Upon completion of this, the second  metatarsal was transposed approximately 3-mm proximally. It was then  temporarily fixated. The forefoot was then loaded. The digit then sat  in the rectus position with no further subluxation noted. At this time,  C-arm fluoroscopy showed excellent alignment of the metatarsal parabola. The dorsal cortical spike created from the transverse osteotomy was  resected and the osteotomy was then fixated with an Arthrex 2 mm x 13 mm  pop-off screw. The area was rasped free of all bony irregularities. The periosteum was closed and attention was now directed towards the  distal aspect of the second toe. At this time, the head of the proximal phalanx, the base of the middle  phalanx were resected. Care was taken to preserve some of the length of  the toe in order to not leave the third digit longer than the second. Once adequate soft tissue exposure had been achieved and bony resection  had been achieved, the PIPJ arthrodesis was fixated utilizing a 0.045  K-wire retrograde out the base of the middle phalanx and then back down  the proximal phalanx. C-arm fluoroscopy showed excellent position of  the hammertoe with the forefoot loaded. It no longer subluxed or  dislocated with the second MPJ.   Being happy with this correction, the  wound was irrigated and the long extensor tendon was closed utilizing  3-0 Vicryl suture. The skin was then closed in layers using 4-0 Vicryl  and a 4-0 Monocryl suture. Being happy with all the correction, a postoperative injection  consisting of total of 30 mL of 0.5% Marcaine plain was administered  throughout the surgical incision site. All wounds were then dressed  with Mastisol, Steri-Strips, Betadine-soaked Adaptic, sterile 4x4s,  4x8s, Kerlix, and Guanako. Previously, the tourniquet had been released  and there was intact cap refill time to all of the digits. A gentle  compressive-type dressing was then applied to the patient's right lower  extremity. The patient tolerated the procedure and anesthesia well. The patient  left the OR with vital signs stable and vascular status intact to all  digits of the right foot. Following a period of postoperative  monitoring, the patient will be discharged to home with oral and written  instructions per myself. He will follow up in my office in the next  three to five days for his first postoperative visit.         Ricky Mendoza DPM    D: 06/20/2019 13:42:17       T: 06/20/2019 15:45:47     [email or web address]  Job#: [id number]     Doc#: [id number]    CC:  Santa Mahmood DPM

## (undated) DEVICE — SHEET,DRAPE,53X77,STERILE: Brand: MEDLINE

## (undated) DEVICE — PRECISION THIN (5.5 X 0.38 X 11.5MM)

## (undated) DEVICE — PADDING UNDERCAST W4INXL4YD 100% COT CRIMPED FINISH WBRL II

## (undated) DEVICE — PRECISION THIN (5.5 X 0.38 X 18.0MM)

## (undated) DEVICE — STRIP,CLOSURE,WOUND,MEDI-STRIP,1/2X4: Brand: MEDLINE

## (undated) DEVICE — GOWN SIRUS NONREIN LG W/TWL: Brand: MEDLINE INDUSTRIES, INC.

## (undated) DEVICE — ELECTROSURGICAL PENCIL BUTTON SWITCH NON COATED BLADE ELECTRODE 10 FT (3 M) CORD HOLSTER: Brand: MEGADYNE

## (undated) DEVICE — INTENDED FOR TISSUE SEPARATION, AND OTHER PROCEDURES THAT REQUIRE A SHARP SURGICAL BLADE TO PUNCTURE OR CUT.: Brand: BARD-PARKER ® STAINLESS STEEL BLADES

## (undated) DEVICE — SUTURE VCRL SZ 3-0 L27IN ABSRB UD L26MM SH 1/2 CIR J416H

## (undated) DEVICE — THIN OFFSET (9.0 X 0.38 X 25.0MM)

## (undated) DEVICE — KIT OR ROOM TURNOVER W/STRAP

## (undated) DEVICE — T-DRAPE,EXTREMITY,STERILE: Brand: MEDLINE

## (undated) DEVICE — PRECISION THIN, OFFSET (5.5 X 0.38 X 25.0MM)

## (undated) DEVICE — BIT DRL DIA2.5MM FT ANK S STL CANN FOR QUICKFIX SCR SYS

## (undated) DEVICE — COVER LT HNDL BLU PLAS

## (undated) DEVICE — SYRINGE MED 10ML LUERLOCK TIP W/O SFTY DISP

## (undated) DEVICE — ZIMMER® STERILE DISPOSABLE TOURNIQUET CUFF WITH PLC, DUAL PORT, SINGLE BLADDER, 18 IN. (46 CM)

## (undated) DEVICE — LARGE TEAR RASP (12.7 X 7.0MM)

## (undated) DEVICE — BASIC SINGLE BASIN 1-LF: Brand: MEDLINE INDUSTRIES, INC.

## (undated) DEVICE — DRESSING PETRO W3XL8IN N ADH OIL EMUL GZ CURAD

## (undated) DEVICE — MASTISOL ADHESIVE LIQ 2/3ML

## (undated) DEVICE — Z DISCONTINUED PER MEDLINE (LOW STOCK)  USE 2422770 DRAPE C ARM W54XL78IN FOR FLROSCN

## (undated) DEVICE — BIT DRL DIA2.5MM LNG GRAD FOR ANK FRAC MGMT SYS

## (undated) DEVICE — STOCKINETTE,IMPERVIOUS,12X48,STERILE: Brand: MEDLINE

## (undated) DEVICE — SYRINGE IRRIG 60ML SFT PLIABLE BLB EZ TO GRP 1 HND USE W/

## (undated) DEVICE — GOWN SIRUS NONREIN XL W/TWL: Brand: MEDLINE INDUSTRIES, INC.

## (undated) DEVICE — MINOR SET UP PK

## (undated) DEVICE — GUIDEWIRE ORTH L150MM DIA0.062IN W/ TRCR TIP DISP FOR ANK

## (undated) DEVICE — BANDAGE COMPR W4INXL12FT E DISP ESMARCH EVEN

## (undated) DEVICE — TOWEL,OR,DSP,ST,BLUE,STD,4/PK,20PK/CS: Brand: MEDLINE

## (undated) DEVICE — BANDAGE GZ W2XL75IN ST RAYON POLY CNFRM STRTCH LTWT

## (undated) DEVICE — CHLORAPREP 26ML ORANGE

## (undated) DEVICE — GLOVE SURG SZ 75 L12IN FNGR THK87MIL WHT LTX FREE

## (undated) DEVICE — TUBING, SUCTION, 1/4" X 12', STRAIGHT: Brand: MEDLINE

## (undated) DEVICE — BANDAGE COMPR W4INXL15FT BGE E SGL LAYERED CLP CLSR

## (undated) DEVICE — BIT DRL DIA1.7MM ADD ON FOR LO PROF PLT SCR SYS

## (undated) DEVICE — CURITY STRETCH BANDAGE: Brand: CURITY

## (undated) DEVICE — ANCHOR FIX DISP FOR ANK FRAC SYS BB-TAK

## (undated) DEVICE — SOLUTION IV IRRIG POUR BRL 0.9% SODIUM CHL 2F7124

## (undated) DEVICE — GUIDEWIRE ORTH L150MM DIA0.053IN W/ TRCR TIP FOR ANK FRAC

## (undated) DEVICE — SUTURE MCRYL SZ 4-0 L27IN ABSRB UD L19MM PS-2 1/2 CIR PRIM Y426H

## (undated) DEVICE — NEEDLE HYPO 25GA L1.5IN BVL ORIENTED ECLIPSE

## (undated) DEVICE — SUTURE VCRL SZ 4-0 L27IN ABSRB UD L19MM FS-2 3/8 CIR REV J422H

## (undated) DEVICE — ELECTRODE PT RET AD L9FT HI MOIST COND ADH HYDRGEL CORDED

## (undated) DEVICE — NEEDLE HYPO 18GA L1.5IN THN WALL PIVOTING SHLD BVL ORIENTED